# Patient Record
Sex: MALE | Race: ASIAN | NOT HISPANIC OR LATINO | Employment: UNEMPLOYED | URBAN - METROPOLITAN AREA
[De-identification: names, ages, dates, MRNs, and addresses within clinical notes are randomized per-mention and may not be internally consistent; named-entity substitution may affect disease eponyms.]

---
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
## 2023-03-10 ENCOUNTER — OFFICE VISIT (OUTPATIENT)
Dept: URGENT CARE | Facility: URGENT CARE | Age: 18
End: 2023-03-10
Payer: COMMERCIAL

## 2023-03-10 VITALS
RESPIRATION RATE: 18 BRPM | DIASTOLIC BLOOD PRESSURE: 82 MMHG | WEIGHT: 160 LBS | OXYGEN SATURATION: 100 % | HEART RATE: 66 BPM | TEMPERATURE: 98.4 F | SYSTOLIC BLOOD PRESSURE: 151 MMHG

## 2023-03-10 DIAGNOSIS — S99.921A INJURY OF RIGHT FOOT, INITIAL ENCOUNTER: Primary | ICD-10-CM

## 2023-03-10 PROCEDURE — 90715 TDAP VACCINE 7 YRS/> IM: CPT | Performed by: NURSE PRACTITIONER

## 2023-03-10 PROCEDURE — 90471 IMMUNIZATION ADMIN: CPT | Performed by: NURSE PRACTITIONER

## 2023-03-10 PROCEDURE — 99213 OFFICE O/P EST LOW 20 MIN: CPT | Mod: 25 | Performed by: NURSE PRACTITIONER

## 2023-03-10 ASSESSMENT — PAIN SCALES - GENERAL: PAINLEVEL: MODERATE PAIN (4)

## 2023-03-10 ASSESSMENT — ENCOUNTER SYMPTOMS
MYALGIAS: 0
FEVER: 0

## 2023-03-11 NOTE — PATIENT INSTRUCTIONS
Watch for any signs of infection which include increased redness or pain to areas where the nails punctured or fever. If you develop any of these symptoms please return for reevaluation. Keep area clean and apply triple antibiotic ointment once a day for the next 3-4 days.

## 2023-03-11 NOTE — PROGRESS NOTES
Assessment & Plan       ICD-10-CM    1. Injury of right foot, initial encounter  S99.921A            Patient instructions:  Watch for any signs of infection which include increased redness or pain to areas where the nails punctured or fever. If you develop any of these symptoms please return for reevaluation. Keep area clean and apply triple antibiotic ointment once a day for the next 3-4 days.     Medical decision making:  No signs of infection noted on exam, wound already washed out well by patient and antibiotic ointment applied so do not thing that area needs to be rewashed or any glue/sutures placed. No bleeding from area. Updated patient's tetanus shot as last one was over 5 years ago. Pt notified to return with any signs of infection, pain to the area, or fevers. Pt agreed with plan.     No follow-ups on file.    At the end of the encounter, I discussed results, diagnosis, medications. Discussed red flags for immediate return to clinic/ER, as well as indications for follow up if no improvement. Patient understood and agreed to plan. Patient was stable for discharge.    Nina Karimi is a 17 year old male who presents to clinic today the following health issues:  Chief Complaint   Patient presents with     Urgent Care     Laceration     Patient is here because he stepped on a nail with his right foot.  Last tetanus injection was five years ago.     Pt reports he was walking around his grandpa's farm when he accidentally stepped on two nails. Pt reports they were very uday nails. Last tetnus shot was in 2014. Pt reports that he already washed out the area and applied antibiotic ointment. Slightly tender but able to walk without difficulty.   Laceration  This is a new problem. The current episode started today. Pertinent negatives include no fever or myalgias.           Review of Systems   Constitutional: Negative for fever.   Musculoskeletal: Negative for myalgias.       Problem List:  2017-07: Hair  loss  2016-09: URI (upper respiratory infection)      No past medical history on file.    Social History     Tobacco Use     Smoking status: Never     Smokeless tobacco: Never     Tobacco comments:     nonsmoking home   Substance Use Topics     Alcohol use: No     Alcohol/week: 0.0 standard drinks           Objective    BP (!) 151/82 (BP Location: Right arm, Patient Position: Sitting, Cuff Size: Adult Regular)   Pulse 66   Temp 98.4  F (36.9  C) (Oral)   Resp 18   Wt 72.6 kg (160 lb)   SpO2 100%   Physical Exam  Constitutional:       Appearance: Normal appearance. He is not toxic-appearing or diaphoretic.   Skin:     General: Skin is warm.      Capillary Refill: Capillary refill takes less than 2 seconds.      Findings: Signs of injury present.      Comments: Two small puncture wounds noted to sole of R foot. No drainage, no swelling, no erythema, no bleeding from sites.    Neurological:      Mental Status: He is alert.              KENNY BOBBY CNP

## 2023-04-16 ENCOUNTER — HEALTH MAINTENANCE LETTER (OUTPATIENT)
Age: 18
End: 2023-04-16

## 2023-11-10 ENCOUNTER — TRANSFERRED RECORDS (OUTPATIENT)
Dept: HEALTH INFORMATION MANAGEMENT | Facility: CLINIC | Age: 18
End: 2023-11-10
Payer: COMMERCIAL

## 2023-11-20 ENCOUNTER — OFFICE VISIT (OUTPATIENT)
Dept: SURGERY | Facility: CLINIC | Age: 18
End: 2023-11-20
Payer: COMMERCIAL

## 2023-11-20 VITALS
HEIGHT: 68 IN | BODY MASS INDEX: 24.25 KG/M2 | HEART RATE: 77 BPM | WEIGHT: 160 LBS | OXYGEN SATURATION: 99 % | DIASTOLIC BLOOD PRESSURE: 98 MMHG | SYSTOLIC BLOOD PRESSURE: 154 MMHG

## 2023-11-20 DIAGNOSIS — R22.42 MASS OF THIGH, LEFT: Primary | ICD-10-CM

## 2023-11-20 PROCEDURE — 99203 OFFICE O/P NEW LOW 30 MIN: CPT | Performed by: SURGERY

## 2023-11-20 NOTE — LETTER
"November 20, 2023           RE:   Trev Goldman 2005      Dear Colleague,    Thank you for referring your patient, Trev Goldman, to [facility]. Please see a copy of my visit note below.    Trev to follow up with Primary Care provider regarding elevated blood pressure.      HPI:  Trev presents today for a subcutaneous mass on his left thigh for the past few months. He denies trauma at to the site.  He has had no drainage from the site in the past.  He has no history of  infection.  It is intermittently painful when pressed upon.  It's size is stable.    PE:  BP (!) 154/98   Pulse 77   Ht 1.727 m (5' 8\")   Wt 72.6 kg (160 lb)   SpO2 99%   BMI 24.33 kg/m    General appearance: well-nourished, no apparent distress  Skin: There is a 1.5 cm firm subcutaneous mass on the posterior left thigh medially.  The overlying skin is normal.  It is mildly tender.         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 We will schedule excision at his convenience. I offered excision in the clinic under local anesthesia. We discussed scaring, risks, benefits and path evaluation.   Again, thank you for allowing me to participate in the care of your patient.      Sincerely,      MD ULYSSES Juárez/ra      D: 11/20/2023  T: 2:40 PM   "

## 2023-11-20 NOTE — PROGRESS NOTES
"HPI:  Trev presents today for a subcutaneous mass on his left thigh for the past few months. He denies trauma at to the site.  He has had no drainage from the site in the past.  He has no history of  infection.  It is intermittently painful when pressed upon.  It's size is stable.    PE:  BP (!) 154/98   Pulse 77   Ht 1.727 m (5' 8\")   Wt 72.6 kg (160 lb)   SpO2 99%   BMI 24.33 kg/m    General appearance: well-nourished, no apparent distress  Skin: There is a 1.5 cm firm subcutaneous mass on the posterior left thigh medially.  The overlying skin is normal.  It is mildly tender.         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 We will schedule excision at his convenience. I offered excision in the clinic under local anesthesia. We discussed scaring, risks, benefits and path evaluation.     Aguilar Vang MD    Please route or send letter to:  Primary Care Provider (PCP)          "

## 2023-12-21 ENCOUNTER — OFFICE VISIT (OUTPATIENT)
Dept: SURGERY | Facility: CLINIC | Age: 18
End: 2023-12-21
Payer: COMMERCIAL

## 2023-12-21 VITALS
HEART RATE: 106 BPM | WEIGHT: 160 LBS | BODY MASS INDEX: 24.25 KG/M2 | SYSTOLIC BLOOD PRESSURE: 130 MMHG | OXYGEN SATURATION: 99 % | HEIGHT: 68 IN | RESPIRATION RATE: 16 BRPM | DIASTOLIC BLOOD PRESSURE: 84 MMHG

## 2023-12-21 DIAGNOSIS — R22.42 MASS OF THIGH, LEFT: Primary | ICD-10-CM

## 2023-12-21 PROCEDURE — 27327 EXC THIGH/KNEE LES SC < 3 CM: CPT | Mod: LT | Performed by: SURGERY

## 2023-12-21 PROCEDURE — 88305 TISSUE EXAM BY PATHOLOGIST: CPT | Performed by: PATHOLOGY

## 2023-12-21 NOTE — LETTER
December 21, 2023      RE:   Trev Goldman 2005      Dear Colleague,    Thank you for referring your patient, Trev Goldman, to [facility] at [facility]. Please see a copy of my visit note below.    General Surgery Operative Note      Pre-operative diagnosis: Left thigh subcutaneous mass   Post-operative diagnosis: same    Procedure: excision of left posterior thigh subcutaneous mass (2 cm)   Surgeon: Aguilar Baez MD   Assistant(s): NONE   Anesthesia: Local    Estimated blood loss: 1 cc     Specimens: Left thigh mass for routine pathology.       The posterior left thigh was prepped and draped in standard sterile fashion.  The skin overlying the mass was anesthetized with local anesthetic.  An incision was made with a length of 1.5 cm.  The incision was carried into the subcutaneous tissue and the mass was completely excised from surrounding tissues using blunt and sharp dissection with a tenotomy scissor.  The mass, which was 2 cm in diameter and had a firm granular fatty appearance, was then passed off the field as specimen.  Hemostasis was maintained throughout with electrocautery.  The wound was then irrigated with sterile saline and closed in two layers.  The skin was closed with 3-0 Vicryl subcuticular suture and Steristrips.  The patient tolerated the procedure well.      Again, thank you for allowing me to participate in the care of your patient.      Sincerely,      Aguilar Vang MD

## 2023-12-21 NOTE — PROGRESS NOTES
General Surgery Operative Note      Pre-operative diagnosis: Left thigh subcutaneous mass   Post-operative diagnosis: same    Procedure: excision of left posterior thigh subcutaneous mass (2 cm)   Surgeon: Aguilar Baez MD   Assistant(s): NONE   Anesthesia: Local    Estimated blood loss: 1 cc     Specimens: Left thigh mass for routine pathology.       The posterior left thigh was prepped and draped in standard sterile fashion.  The skin overlying the mass was anesthetized with local anesthetic.  An incision was made with a length of 1.5 cm.  The incision was carried into the subcutaneous tissue and the mass was completely excised from surrounding tissues using blunt and sharp dissection with a tenotomy scissor.  The mass, which was 2 cm in diameter and had a firm granular fatty appearance, was then passed off the field as specimen.  Hemostasis was maintained throughout with electrocautery.  The wound was then irrigated with sterile saline and closed in two layers.  The skin was closed with 3-0 Vicryl subcuticular suture and Steristrips.  The patient tolerated the procedure well.      Aguilar Baez MD

## 2023-12-26 LAB
PATH REPORT.COMMENTS IMP SPEC: NORMAL
PATH REPORT.COMMENTS IMP SPEC: NORMAL
PATH REPORT.FINAL DX SPEC: NORMAL
PATH REPORT.GROSS SPEC: NORMAL
PATH REPORT.MICROSCOPIC SPEC OTHER STN: NORMAL
PATH REPORT.RELEVANT HX SPEC: NORMAL
PHOTO IMAGE: NORMAL

## 2023-12-26 NOTE — RESULT ENCOUNTER NOTE
Patient was phoned by me with result.  Detailed voicemail left. Diagnosis consistent with clinical findings.

## 2024-01-30 ENCOUNTER — TRANSFERRED RECORDS (OUTPATIENT)
Dept: HEALTH INFORMATION MANAGEMENT | Facility: CLINIC | Age: 19
End: 2024-01-30
Payer: COMMERCIAL

## 2024-02-01 PROBLEM — Z11.1 SCREENING EXAMINATION FOR PULMONARY TUBERCULOSIS: Status: ACTIVE | Noted: 2024-02-01

## 2024-02-12 ENCOUNTER — APPOINTMENT (OUTPATIENT)
Dept: LAB | Facility: CLINIC | Age: 19
End: 2024-02-12
Payer: COMMERCIAL

## 2024-02-12 PROCEDURE — 36415 COLL VENOUS BLD VENIPUNCTURE: CPT | Performed by: PATHOLOGY

## 2024-02-12 PROCEDURE — 86481 TB AG RESPONSE T-CELL SUSP: CPT | Performed by: PATHOLOGY

## 2024-02-13 LAB
GAMMA INTERFERON BACKGROUND BLD IA-ACNC: 0.01 IU/ML
M TB IFN-G BLD-IMP: NEGATIVE
M TB IFN-G CD4+ BCKGRND COR BLD-ACNC: 9.99 IU/ML
MITOGEN IGNF BCKGRD COR BLD-ACNC: 0 IU/ML
MITOGEN IGNF BCKGRD COR BLD-ACNC: 0.01 IU/ML
QUANTIFERON MITOGEN: 10 IU/ML
QUANTIFERON NIL TUBE: 0.01 IU/ML
QUANTIFERON TB1 TUBE: 0.02 IU/ML
QUANTIFERON TB2 TUBE: 0.01

## 2024-04-19 ENCOUNTER — TRANSFERRED RECORDS (OUTPATIENT)
Dept: HEALTH INFORMATION MANAGEMENT | Facility: CLINIC | Age: 19
End: 2024-04-19
Payer: COMMERCIAL

## 2024-06-22 ENCOUNTER — HEALTH MAINTENANCE LETTER (OUTPATIENT)
Age: 19
End: 2024-06-22

## 2024-10-29 ENCOUNTER — TRANSFERRED RECORDS (OUTPATIENT)
Dept: HEALTH INFORMATION MANAGEMENT | Facility: CLINIC | Age: 19
End: 2024-10-29
Payer: COMMERCIAL

## 2024-12-03 ENCOUNTER — TRANSFERRED RECORDS (OUTPATIENT)
Dept: HEALTH INFORMATION MANAGEMENT | Facility: CLINIC | Age: 19
End: 2024-12-03
Payer: COMMERCIAL

## 2025-02-03 ENCOUNTER — IMMUNIZATION (OUTPATIENT)
Dept: PEDIATRICS | Facility: CLINIC | Age: 20
End: 2025-02-03
Payer: COMMERCIAL

## 2025-02-03 DIAGNOSIS — Z23 NEED FOR PROPHYLACTIC VACCINATION AND INOCULATION AGAINST INFLUENZA: Primary | ICD-10-CM

## 2025-02-03 PROCEDURE — 90471 IMMUNIZATION ADMIN: CPT

## 2025-02-03 PROCEDURE — 99207 PR NO CHARGE NURSE ONLY: CPT

## 2025-02-03 PROCEDURE — 90656 IIV3 VACC NO PRSV 0.5 ML IM: CPT

## 2025-02-08 ENCOUNTER — ANCILLARY PROCEDURE (OUTPATIENT)
Dept: ULTRASOUND IMAGING | Facility: CLINIC | Age: 20
End: 2025-02-08
Attending: INTERNAL MEDICINE
Payer: COMMERCIAL

## 2025-02-08 DIAGNOSIS — R10.11 RUQ ABDOMINAL PAIN: ICD-10-CM

## 2025-02-08 PROCEDURE — 76705 ECHO EXAM OF ABDOMEN: CPT | Mod: GC | Performed by: RADIOLOGY

## 2025-04-28 ENCOUNTER — OFFICE VISIT (OUTPATIENT)
Dept: PEDIATRICS | Facility: CLINIC | Age: 20
End: 2025-04-28
Payer: COMMERCIAL

## 2025-04-28 VITALS
DIASTOLIC BLOOD PRESSURE: 87 MMHG | TEMPERATURE: 98 F | RESPIRATION RATE: 16 BRPM | BODY MASS INDEX: 29.8 KG/M2 | HEART RATE: 91 BPM | WEIGHT: 196.6 LBS | SYSTOLIC BLOOD PRESSURE: 129 MMHG | OXYGEN SATURATION: 98 % | HEIGHT: 68 IN

## 2025-04-28 DIAGNOSIS — R10.13 EPIGASTRIC PAIN: Primary | ICD-10-CM

## 2025-04-28 PROCEDURE — 3079F DIAST BP 80-89 MM HG: CPT | Performed by: INTERNAL MEDICINE

## 2025-04-28 PROCEDURE — 99213 OFFICE O/P EST LOW 20 MIN: CPT | Performed by: INTERNAL MEDICINE

## 2025-04-28 PROCEDURE — 1125F AMNT PAIN NOTED PAIN PRSNT: CPT | Performed by: INTERNAL MEDICINE

## 2025-04-28 PROCEDURE — 3074F SYST BP LT 130 MM HG: CPT | Performed by: INTERNAL MEDICINE

## 2025-04-28 ASSESSMENT — PAIN SCALES - GENERAL: PAINLEVEL_OUTOF10: MILD PAIN (3)

## 2025-04-28 NOTE — PROGRESS NOTES
"  Assessment & Plan       ICD-10-CM    1. Epigastric pain  R10.13 CT Abdomen Pelvis w/o & w Contrast         Persistent intermittent epigastric pain of unclear etiology. No improvement after 2 weeks of omeprazole. CMP without significant findings, US w/o specific etiology with the liver, gallbladder or head of the pancreas. dDx remains gastritis, PUD, DUD, rib pain or other.  We discussed additional evaluation options.  Start with CT.  If this does not reveal a cause, plan EGD as the next step.  If all is normal, consider a trial of scheduled NSAIDs for possible myofascial cause.     Chris Kimball MD      Nina Karimi is a 19 year old, presenting for the following health issues:  Chest Pain (RUQ)        4/28/2025    10:15 AM   Additional Questions   Roomed by Darshana Sun     History of Present Illness       Reason for visit:  Chest/rib pain    He eats 2-3 servings of fruits and vegetables daily.He consumes 0 sweetened beverage(s) daily.He exercises with enough effort to increase his heart rate 20 to 29 minutes per day.  He exercises with enough effort to increase his heart rate 3 or less days per week. He is missing 1 dose(s) of medications per week.  He is not taking prescribed medications regularly due to remembering to take.      Persistent pain in the epigastric and RUQ areas.  Evaluated in February w/o specific etiology found.  No improvement after 2 weeks of omeprazole. CMP without significant findings, US w/o specific etiology with the liver, gallbladder or head of the pancreas.  Pain is intermittent. Lasts a few minutes to a few hours.  No specific provoking or palliating factors noted.   No change w/ foods or activity other than sometimes lying down or getting up from lying down will elicit symptoms.           Objective    /87 (BP Location: Right arm, Patient Position: Sitting, Cuff Size: Adult Large)   Pulse 91   Temp 98  F (36.7  C) (Temporal)   Resp 16   Ht 1.727 m (5' 8\")   Wt 89.2 kg " (196 lb 9.6 oz)   SpO2 98%   BMI 29.89 kg/m    Body mass index is 29.89 kg/m .  Physical Exam   NECK: Supple. No LAD or TM.  LUNGS: Clear to auscultation bilaterally. No rhonchi, rales, wheezes or retractions.  CV: Regular rate and rhythm.  No murmurs, rubs or gallops. Pulses 2+ radial.  ABD: Bowel sounds positive throughout. Soft, nontender, nondistended. No organomegaly. No masses.              Signed Electronically by: Chris Kimball MD

## 2025-05-06 ENCOUNTER — ANCILLARY PROCEDURE (OUTPATIENT)
Dept: CT IMAGING | Facility: CLINIC | Age: 20
End: 2025-05-06
Attending: INTERNAL MEDICINE
Payer: COMMERCIAL

## 2025-05-06 DIAGNOSIS — R10.13 EPIGASTRIC PAIN: ICD-10-CM

## 2025-05-06 PROCEDURE — 74177 CT ABD & PELVIS W/CONTRAST: CPT | Performed by: STUDENT IN AN ORGANIZED HEALTH CARE EDUCATION/TRAINING PROGRAM

## 2025-05-06 RX ORDER — IOPAMIDOL 755 MG/ML
96 INJECTION, SOLUTION INTRAVASCULAR ONCE
Status: COMPLETED | OUTPATIENT
Start: 2025-05-06 | End: 2025-05-06

## 2025-05-06 RX ADMIN — IOPAMIDOL 96 ML: 755 INJECTION, SOLUTION INTRAVASCULAR at 18:16

## 2025-05-06 NOTE — DISCHARGE INSTRUCTIONS

## 2025-05-07 ENCOUNTER — RESULTS FOLLOW-UP (OUTPATIENT)
Dept: PEDIATRICS | Facility: CLINIC | Age: 20
End: 2025-05-07

## 2025-07-12 ENCOUNTER — HEALTH MAINTENANCE LETTER (OUTPATIENT)
Age: 20
End: 2025-07-12